# Patient Record
(demographics unavailable — no encounter records)

---

## 2021-08-18 NOTE — REP
INDICATION:

GABBY DIAG MAMMO/GABBY BREAST LUMPS.



COMPARISON:

03/06/2020 the only prior for comparison.  No DBT images for comparison.



TECHNIQUE:

Diagnostic bilateral digital mammography was carried out in the CC and MLO projections

using both 2D and 3D modalities and compared to the prior exam.  Patient complains of

bilateral palpable masses.  These areas were indicated on each breast by the

technologist placing a triangular-shaped marker on the skin of each breast.  In

addition to standard imaging breast ultrasonography was obtained over the regions of

interest



FINDINGS:

The breasts are unchanged in size and shape.  Scattered dense heterogenous

fibroglandular elements are seen status quo.  There are no gustavo soft tissue densities

or spiculated masses.  There is no internal architectural distortion.  Stable benign

appearing calcifications are again seen bilaterally.  Some of these are in groups,

however, no one group is more suspicious than any other.  No suspicious calcifications

are evident.  There is no skin thickening or nipple retraction.



Diagnostic ultrasonography over the region of interest bilaterally shows no cystic or

solid masses on the right.  Seen in the left breast there is a potential seemingly

encapsulated elongated somewhat oval-shaped isoechoic to hypoechoic nodule at the 7

o'clock position at the site of the clinically palpable abnormality and measuring

approximately 5.1 x 0.9 x 2.2 cm.



The Volpara volumetric breast density pattern is b.



IMPRESSION:

BIRADS/ACR category 3 probably benign left breast finding as described above seen

ultrasonographically only without a mammographic correlate.  Six-month follow-up

recommended.

A negative mammogram and a negative ultrasound examination should never curtail biopsy

of a clinically palpable mass or clinically suspicious area the breast.  Consider

breast MRI at this time.



This patient's Tyrer-Cuzick lifetime breast cancer risk assessment score is 19%.



This mammogram was interpreted with the aid of an FDA-approved computer-aided

detection system.



The patient states she had a clinical breast exam in June 2021.



The patient letter being requested is M3.



RECOMMENDATION:

As above





<Electronically signed by Aubrey Bhat > 08/18/21 [id number]